# Patient Record
Sex: FEMALE | Race: WHITE | NOT HISPANIC OR LATINO | Employment: FULL TIME | URBAN - METROPOLITAN AREA
[De-identification: names, ages, dates, MRNs, and addresses within clinical notes are randomized per-mention and may not be internally consistent; named-entity substitution may affect disease eponyms.]

---

## 2018-11-16 DIAGNOSIS — Z31.69 WANTED BIRTH: Primary | ICD-10-CM

## 2018-11-16 DIAGNOSIS — N97.9 PRIMARY FEMALE INFERTILITY: ICD-10-CM

## 2018-11-17 ENCOUNTER — HOSPITAL ENCOUNTER (OUTPATIENT)
Dept: LAB | Facility: CLINIC | Age: 38
Discharge: HOME | End: 2018-11-17
Attending: OBSTETRICS & GYNECOLOGY | Admitting: OBSTETRICS & GYNECOLOGY
Payer: COMMERCIAL

## 2018-11-17 DIAGNOSIS — Z31.69 WANTED BIRTH: ICD-10-CM

## 2018-11-17 DIAGNOSIS — N97.9 PRIMARY FEMALE INFERTILITY: ICD-10-CM

## 2018-11-17 LAB
ESTRADIOL SERPL-MCNC: 23 PG/ML
FSH SERPL-ACNC: 4.4 IU/L
LH SERPL-ACNC: 5.2 IU/L
PROLACTIN SERPL-MCNC: 7 UG/L (ref 3–27)
TSH SERPL DL<=0.005 MIU/L-ACNC: 3.08 MU/L (ref 0.4–4)

## 2018-11-17 PROCEDURE — 82670 ASSAY OF TOTAL ESTRADIOL: CPT | Performed by: OBSTETRICS & GYNECOLOGY

## 2018-11-17 PROCEDURE — 84443 ASSAY THYROID STIM HORMONE: CPT | Performed by: OBSTETRICS & GYNECOLOGY

## 2018-11-17 PROCEDURE — 83002 ASSAY OF GONADOTROPIN (LH): CPT | Performed by: OBSTETRICS & GYNECOLOGY

## 2018-11-17 PROCEDURE — 83520 IMMUNOASSAY QUANT NOS NONAB: CPT | Performed by: OBSTETRICS & GYNECOLOGY

## 2018-11-17 PROCEDURE — 83001 ASSAY OF GONADOTROPIN (FSH): CPT | Performed by: OBSTETRICS & GYNECOLOGY

## 2018-11-17 PROCEDURE — 36415 COLL VENOUS BLD VENIPUNCTURE: CPT | Performed by: OBSTETRICS & GYNECOLOGY

## 2018-11-17 PROCEDURE — 84146 ASSAY OF PROLACTIN: CPT | Performed by: OBSTETRICS & GYNECOLOGY

## 2018-11-21 LAB — MIS SERPL-MCNC: 6.94 NG/ML (ref 0.18–11.71)

## 2019-02-10 ENCOUNTER — HOSPITAL ENCOUNTER (OUTPATIENT)
Dept: ULTRASOUND IMAGING | Facility: CLINIC | Age: 39
Discharge: HOME | End: 2019-02-10
Attending: OBSTETRICS & GYNECOLOGY | Admitting: OBSTETRICS & GYNECOLOGY
Payer: COMMERCIAL

## 2019-02-10 DIAGNOSIS — N97.9 PRIMARY FEMALE INFERTILITY: ICD-10-CM

## 2019-02-10 PROCEDURE — 76830 TRANSVAGINAL US NON-OB: CPT

## 2019-04-13 ENCOUNTER — HOSPITAL ENCOUNTER (OUTPATIENT)
Dept: ULTRASOUND IMAGING | Facility: CLINIC | Age: 39
Discharge: HOME | End: 2019-04-13
Attending: OBSTETRICS & GYNECOLOGY | Admitting: OBSTETRICS & GYNECOLOGY
Payer: COMMERCIAL

## 2019-04-13 DIAGNOSIS — N97.9 INFERTILITY, FEMALE: ICD-10-CM

## 2019-04-13 PROCEDURE — 76830 TRANSVAGINAL US NON-OB: CPT

## 2019-09-27 LAB
HBV SURFACE AG SERPL QL IA: NEGATIVE
HIV 1+2 AB+HIV1 P24 AG SERPL QL IA: NEGATIVE
RUBELLA ANTIBODY IGG QUANTITATIVE: NORMAL IU/ML

## 2019-10-25 ENCOUNTER — TRANSFERRED RECORDS (OUTPATIENT)
Dept: HEALTH INFORMATION MANAGEMENT | Facility: CLINIC | Age: 39
End: 2019-10-25

## 2019-10-25 ENCOUNTER — MEDICAL CORRESPONDENCE (OUTPATIENT)
Dept: HEALTH INFORMATION MANAGEMENT | Facility: CLINIC | Age: 39
End: 2019-10-25

## 2019-11-18 ENCOUNTER — PRE VISIT (OUTPATIENT)
Dept: MATERNAL FETAL MEDICINE | Facility: CLINIC | Age: 39
End: 2019-11-18

## 2019-11-21 ENCOUNTER — HOSPITAL ENCOUNTER (OUTPATIENT)
Dept: ULTRASOUND IMAGING | Facility: CLINIC | Age: 39
Discharge: HOME | End: 2019-11-21
Attending: OBSTETRICS & GYNECOLOGY | Admitting: OBSTETRICS & GYNECOLOGY
Payer: COMMERCIAL

## 2019-11-21 ENCOUNTER — OFFICE VISIT (OUTPATIENT)
Dept: MATERNAL FETAL MEDICINE | Facility: CLINIC | Age: 39
End: 2019-11-21
Attending: OBSTETRICS & GYNECOLOGY
Payer: COMMERCIAL

## 2019-11-21 DIAGNOSIS — O26.90 PREGNANCY RELATED CONDITION, ANTEPARTUM: ICD-10-CM

## 2019-11-21 DIAGNOSIS — O09.522 MULTIGRAVIDA OF ADVANCED MATERNAL AGE IN SECOND TRIMESTER: Primary | ICD-10-CM

## 2019-11-21 PROCEDURE — 76811 OB US DETAILED SNGL FETUS: CPT

## 2019-11-21 NOTE — PROGRESS NOTES
Please see full imaging report from [facility] program under imaging tab.    Marginal cord insertion - follow up with primary OB team.     Claudia Sandhu MD  Maternal Fetal Medicine

## 2019-12-08 ENCOUNTER — HEALTH MAINTENANCE LETTER (OUTPATIENT)
Age: 39
End: 2019-12-08

## 2020-02-19 RX ORDER — CEFAZOLIN SODIUM 2 G/100ML
2 INJECTION, SOLUTION INTRAVENOUS
Status: CANCELLED | OUTPATIENT
Start: 2020-02-19

## 2020-02-19 RX ORDER — CEFAZOLIN SODIUM 1 G/3ML
1 INJECTION, POWDER, FOR SOLUTION INTRAMUSCULAR; INTRAVENOUS SEE ADMIN INSTRUCTIONS
Status: CANCELLED | OUTPATIENT
Start: 2020-02-19

## 2020-02-19 RX ORDER — SODIUM CHLORIDE, SODIUM LACTATE, POTASSIUM CHLORIDE, CALCIUM CHLORIDE 600; 310; 30; 20 MG/100ML; MG/100ML; MG/100ML; MG/100ML
INJECTION, SOLUTION INTRAVENOUS CONTINUOUS
Status: CANCELLED | OUTPATIENT
Start: 2020-02-19

## 2020-02-19 RX ORDER — CITRIC ACID/SODIUM CITRATE 334-500MG
30 SOLUTION, ORAL ORAL
Status: CANCELLED | OUTPATIENT
Start: 2020-02-19

## 2020-03-30 LAB — GROUP B STREP PCR: NEGATIVE

## 2020-04-13 DIAGNOSIS — Z01.818 PREOPERATIVE EXAMINATION: Primary | ICD-10-CM

## 2020-04-15 ENCOUNTER — ANESTHESIA EVENT (OUTPATIENT)
Dept: OBGYN | Facility: CLINIC | Age: 40
End: 2020-04-15
Payer: COMMERCIAL

## 2020-04-15 ENCOUNTER — HOSPITAL ENCOUNTER (OUTPATIENT)
Dept: LAB | Facility: CLINIC | Age: 40
Discharge: HOME | End: 2020-04-15
Attending: PHYSICIAN ASSISTANT | Admitting: PHYSICIAN ASSISTANT
Payer: COMMERCIAL

## 2020-04-15 DIAGNOSIS — Z01.818 PREOPERATIVE EXAMINATION: ICD-10-CM

## 2020-04-15 LAB
ABO + RH BLD: NORMAL
ABO + RH BLD: NORMAL
BLD GP AB SCN SERPL QL: NORMAL
BLOOD BANK CMNT PATIENT-IMP: NORMAL
ERYTHROCYTE [DISTWIDTH] IN BLOOD BY AUTOMATED COUNT: 14.6 % (ref 10–15)
HCT VFR BLD AUTO: 34.1 % (ref 35–47)
HGB BLD-MCNC: 11.4 G/DL (ref 11.7–15.7)
MCH RBC QN AUTO: 30.9 PG (ref 26.5–33)
MCHC RBC AUTO-ENTMCNC: 33.4 G/DL (ref 31.5–36.5)
MCV RBC AUTO: 92 FL (ref 78–100)
PLATELET # BLD AUTO: 252 10E9/L (ref 150–450)
RBC # BLD AUTO: 3.69 10E12/L (ref 3.8–5.2)
SPECIMEN EXP DATE BLD: NORMAL
WBC # BLD AUTO: 10.8 10E9/L (ref 4–11)

## 2020-04-15 PROCEDURE — 86900 BLOOD TYPING SEROLOGIC ABO: CPT | Performed by: PHYSICIAN ASSISTANT

## 2020-04-15 PROCEDURE — 86850 RBC ANTIBODY SCREEN: CPT | Performed by: PHYSICIAN ASSISTANT

## 2020-04-15 PROCEDURE — 36415 COLL VENOUS BLD VENIPUNCTURE: CPT | Performed by: PHYSICIAN ASSISTANT

## 2020-04-15 PROCEDURE — 85027 COMPLETE CBC AUTOMATED: CPT | Performed by: PHYSICIAN ASSISTANT

## 2020-04-15 PROCEDURE — 86901 BLOOD TYPING SEROLOGIC RH(D): CPT | Performed by: PHYSICIAN ASSISTANT

## 2020-04-15 PROCEDURE — 86780 TREPONEMA PALLIDUM: CPT | Performed by: PHYSICIAN ASSISTANT

## 2020-04-16 ENCOUNTER — ANESTHESIA (OUTPATIENT)
Dept: OBGYN | Facility: CLINIC | Age: 40
End: 2020-04-16
Payer: COMMERCIAL

## 2020-04-16 ENCOUNTER — HOSPITAL ENCOUNTER (INPATIENT)
Facility: CLINIC | Age: 40
LOS: 2 days | Discharge: HOME | End: 2020-04-18
Attending: OBSTETRICS & GYNECOLOGY | Admitting: OBSTETRICS & GYNECOLOGY
Payer: COMMERCIAL

## 2020-04-16 DIAGNOSIS — Z98.891 S/P CESAREAN SECTION: Primary | ICD-10-CM

## 2020-04-16 LAB
HGB BLD-MCNC: 11.3 G/DL (ref 11.7–15.7)
T PALLIDUM AB SER QL: NONREACTIVE

## 2020-04-16 PROCEDURE — 12000035 ZZH R&B POSTPARTUM

## 2020-04-16 PROCEDURE — 25000128 H RX IP 250 OP 636: Performed by: OBSTETRICS & GYNECOLOGY

## 2020-04-16 PROCEDURE — 36415 COLL VENOUS BLD VENIPUNCTURE: CPT | Performed by: OBSTETRICS & GYNECOLOGY

## 2020-04-16 PROCEDURE — 25000125 ZZHC RX 250: Performed by: OBSTETRICS & GYNECOLOGY

## 2020-04-16 PROCEDURE — 37000008 ZZH ANESTHESIA TECHNICAL FEE, 1ST 30 MIN: Performed by: OBSTETRICS & GYNECOLOGY

## 2020-04-16 PROCEDURE — 37000009 ZZH ANESTHESIA TECHNICAL FEE, EACH ADDTL 15 MIN: Performed by: OBSTETRICS & GYNECOLOGY

## 2020-04-16 PROCEDURE — 25800030 ZZH RX IP 258 OP 636: Performed by: OBSTETRICS & GYNECOLOGY

## 2020-04-16 PROCEDURE — 71000012 ZZH RECOVERY PHASE 1 LEVEL 1 FIRST HR: Performed by: OBSTETRICS & GYNECOLOGY

## 2020-04-16 PROCEDURE — 25000132 ZZH RX MED GY IP 250 OP 250 PS 637

## 2020-04-16 PROCEDURE — 27210794 ZZH OR GENERAL SUPPLY STERILE: Performed by: OBSTETRICS & GYNECOLOGY

## 2020-04-16 PROCEDURE — 25000128 H RX IP 250 OP 636: Performed by: ANESTHESIOLOGY

## 2020-04-16 PROCEDURE — 25000125 ZZHC RX 250: Performed by: NURSE ANESTHETIST, CERTIFIED REGISTERED

## 2020-04-16 PROCEDURE — 25000128 H RX IP 250 OP 636: Performed by: NURSE ANESTHETIST, CERTIFIED REGISTERED

## 2020-04-16 PROCEDURE — 36000056 ZZH SURGERY LEVEL 3 1ST 30 MIN: Performed by: OBSTETRICS & GYNECOLOGY

## 2020-04-16 PROCEDURE — 25800030 ZZH RX IP 258 OP 636: Performed by: NURSE ANESTHETIST, CERTIFIED REGISTERED

## 2020-04-16 PROCEDURE — 25000132 ZZH RX MED GY IP 250 OP 250 PS 637: Performed by: OBSTETRICS & GYNECOLOGY

## 2020-04-16 PROCEDURE — 25000128 H RX IP 250 OP 636

## 2020-04-16 PROCEDURE — 36000058 ZZH SURGERY LEVEL 3 EA 15 ADDTL MIN: Performed by: OBSTETRICS & GYNECOLOGY

## 2020-04-16 PROCEDURE — 71000013 ZZH RECOVERY PHASE 1 LEVEL 1 EA ADDTL HR: Performed by: OBSTETRICS & GYNECOLOGY

## 2020-04-16 PROCEDURE — 85018 HEMOGLOBIN: CPT | Performed by: OBSTETRICS & GYNECOLOGY

## 2020-04-16 RX ORDER — BUPIVACAINE HYDROCHLORIDE 7.5 MG/ML
INJECTION, SOLUTION INTRASPINAL PRN
Status: DISCONTINUED | OUTPATIENT
Start: 2020-04-16 | End: 2020-04-16

## 2020-04-16 RX ORDER — ONDANSETRON 2 MG/ML
INJECTION INTRAMUSCULAR; INTRAVENOUS PRN
Status: DISCONTINUED | OUTPATIENT
Start: 2020-04-16 | End: 2020-04-16

## 2020-04-16 RX ORDER — OXYTOCIN/0.9 % SODIUM CHLORIDE 30/500 ML
100 PLASTIC BAG, INJECTION (ML) INTRAVENOUS CONTINUOUS
Status: DISCONTINUED | OUTPATIENT
Start: 2020-04-16 | End: 2020-04-18 | Stop reason: HOSPADM

## 2020-04-16 RX ORDER — CEFAZOLIN SODIUM 2 G/100ML
2 INJECTION, SOLUTION INTRAVENOUS
Status: COMPLETED | OUTPATIENT
Start: 2020-04-16 | End: 2020-04-16

## 2020-04-16 RX ORDER — SIMETHICONE 80 MG
80 TABLET,CHEWABLE ORAL 4 TIMES DAILY PRN
Status: DISCONTINUED | OUTPATIENT
Start: 2020-04-16 | End: 2020-04-18 | Stop reason: HOSPADM

## 2020-04-16 RX ORDER — MISOPROSTOL 200 UG/1
800 TABLET ORAL
Status: DISCONTINUED | OUTPATIENT
Start: 2020-04-16 | End: 2020-04-18 | Stop reason: HOSPADM

## 2020-04-16 RX ORDER — LEVOTHYROXINE SODIUM 25 UG/1
TABLET ORAL
Status: ON HOLD | COMMUNITY
Start: 2018-12-01 | End: 2020-04-18

## 2020-04-16 RX ORDER — CITRIC ACID/SODIUM CITRATE 334-500MG
SOLUTION, ORAL ORAL
Status: COMPLETED
Start: 2020-04-16 | End: 2020-04-16

## 2020-04-16 RX ORDER — HYDROCORTISONE 2.5 %
CREAM (GRAM) TOPICAL 3 TIMES DAILY PRN
Status: DISCONTINUED | OUTPATIENT
Start: 2020-04-16 | End: 2020-04-18 | Stop reason: HOSPADM

## 2020-04-16 RX ORDER — CEFAZOLIN SODIUM 1 G/3ML
1 INJECTION, POWDER, FOR SOLUTION INTRAMUSCULAR; INTRAVENOUS SEE ADMIN INSTRUCTIONS
Status: DISCONTINUED | OUTPATIENT
Start: 2020-04-16 | End: 2020-04-16 | Stop reason: HOSPADM

## 2020-04-16 RX ORDER — OXYTOCIN 10 [USP'U]/ML
10 INJECTION, SOLUTION INTRAMUSCULAR; INTRAVENOUS
Status: DISCONTINUED | OUTPATIENT
Start: 2020-04-16 | End: 2020-04-18 | Stop reason: HOSPADM

## 2020-04-16 RX ORDER — DEXTROSE, SODIUM CHLORIDE, SODIUM LACTATE, POTASSIUM CHLORIDE, AND CALCIUM CHLORIDE 5; .6; .31; .03; .02 G/100ML; G/100ML; G/100ML; G/100ML; G/100ML
INJECTION, SOLUTION INTRAVENOUS CONTINUOUS
Status: DISCONTINUED | OUTPATIENT
Start: 2020-04-16 | End: 2020-04-18 | Stop reason: HOSPADM

## 2020-04-16 RX ORDER — CEFAZOLIN SODIUM 2 G/100ML
INJECTION, SOLUTION INTRAVENOUS
Status: COMPLETED
Start: 2020-04-16 | End: 2020-04-16

## 2020-04-16 RX ORDER — MODIFIED LANOLIN
OINTMENT (GRAM) TOPICAL
Status: DISCONTINUED | OUTPATIENT
Start: 2020-04-16 | End: 2020-04-18 | Stop reason: HOSPADM

## 2020-04-16 RX ORDER — ONDANSETRON 2 MG/ML
4 INJECTION INTRAMUSCULAR; INTRAVENOUS EVERY 6 HOURS PRN
Status: DISCONTINUED | OUTPATIENT
Start: 2020-04-16 | End: 2020-04-18 | Stop reason: HOSPADM

## 2020-04-16 RX ORDER — SODIUM CHLORIDE, SODIUM LACTATE, POTASSIUM CHLORIDE, CALCIUM CHLORIDE 600; 310; 30; 20 MG/100ML; MG/100ML; MG/100ML; MG/100ML
INJECTION, SOLUTION INTRAVENOUS CONTINUOUS
Status: DISCONTINUED | OUTPATIENT
Start: 2020-04-16 | End: 2020-04-16 | Stop reason: HOSPADM

## 2020-04-16 RX ORDER — NALBUPHINE HYDROCHLORIDE 10 MG/ML
2.5-5 INJECTION, SOLUTION INTRAMUSCULAR; INTRAVENOUS; SUBCUTANEOUS EVERY 6 HOURS PRN
Status: DISCONTINUED | OUTPATIENT
Start: 2020-04-16 | End: 2020-04-18 | Stop reason: HOSPADM

## 2020-04-16 RX ORDER — TRANEXAMIC ACID 10 MG/ML
1 INJECTION, SOLUTION INTRAVENOUS EVERY 30 MIN PRN
Status: DISCONTINUED | OUTPATIENT
Start: 2020-04-16 | End: 2020-04-18 | Stop reason: HOSPADM

## 2020-04-16 RX ORDER — OXYTOCIN/0.9 % SODIUM CHLORIDE 30/500 ML
340 PLASTIC BAG, INJECTION (ML) INTRAVENOUS CONTINUOUS PRN
Status: DISCONTINUED | OUTPATIENT
Start: 2020-04-16 | End: 2020-04-18 | Stop reason: HOSPADM

## 2020-04-16 RX ORDER — EPHEDRINE SULFATE 50 MG/ML
5 INJECTION, SOLUTION INTRAMUSCULAR; INTRAVENOUS; SUBCUTANEOUS
Status: DISCONTINUED | OUTPATIENT
Start: 2020-04-16 | End: 2020-04-18 | Stop reason: HOSPADM

## 2020-04-16 RX ORDER — ONDANSETRON 2 MG/ML
4 INJECTION INTRAMUSCULAR; INTRAVENOUS EVERY 6 HOURS PRN
Status: DISCONTINUED | OUTPATIENT
Start: 2020-04-16 | End: 2020-04-16

## 2020-04-16 RX ORDER — LIDOCAINE 40 MG/G
CREAM TOPICAL
Status: DISCONTINUED | OUTPATIENT
Start: 2020-04-16 | End: 2020-04-18 | Stop reason: HOSPADM

## 2020-04-16 RX ORDER — ACETAMINOPHEN 325 MG/1
975 TABLET ORAL EVERY 6 HOURS
Status: DISCONTINUED | OUTPATIENT
Start: 2020-04-16 | End: 2020-04-18 | Stop reason: HOSPADM

## 2020-04-16 RX ORDER — CITRIC ACID/SODIUM CITRATE 334-500MG
30 SOLUTION, ORAL ORAL
Status: COMPLETED | OUTPATIENT
Start: 2020-04-16 | End: 2020-04-16

## 2020-04-16 RX ORDER — IBUPROFEN 400 MG/1
800 TABLET, FILM COATED ORAL EVERY 6 HOURS
Status: DISCONTINUED | OUTPATIENT
Start: 2020-04-17 | End: 2020-04-18 | Stop reason: HOSPADM

## 2020-04-16 RX ORDER — OXYCODONE HYDROCHLORIDE 5 MG/1
5 TABLET ORAL EVERY 4 HOURS PRN
Status: DISCONTINUED | OUTPATIENT
Start: 2020-04-16 | End: 2020-04-18 | Stop reason: HOSPADM

## 2020-04-16 RX ORDER — KETOROLAC TROMETHAMINE 30 MG/ML
30 INJECTION, SOLUTION INTRAMUSCULAR; INTRAVENOUS EVERY 6 HOURS
Status: COMPLETED | OUTPATIENT
Start: 2020-04-16 | End: 2020-04-16

## 2020-04-16 RX ORDER — NALOXONE HYDROCHLORIDE 0.4 MG/ML
.1-.4 INJECTION, SOLUTION INTRAMUSCULAR; INTRAVENOUS; SUBCUTANEOUS
Status: DISCONTINUED | OUTPATIENT
Start: 2020-04-16 | End: 2020-04-18 | Stop reason: HOSPADM

## 2020-04-16 RX ORDER — BISACODYL 10 MG
10 SUPPOSITORY, RECTAL RECTAL DAILY PRN
Status: DISCONTINUED | OUTPATIENT
Start: 2020-04-18 | End: 2020-04-18 | Stop reason: HOSPADM

## 2020-04-16 RX ORDER — ONDANSETRON 4 MG/1
4 TABLET, ORALLY DISINTEGRATING ORAL EVERY 6 HOURS PRN
Status: DISCONTINUED | OUTPATIENT
Start: 2020-04-16 | End: 2020-04-18 | Stop reason: HOSPADM

## 2020-04-16 RX ORDER — ONDANSETRON 2 MG/ML
INJECTION INTRAMUSCULAR; INTRAVENOUS
Status: COMPLETED
Start: 2020-04-16 | End: 2020-04-16

## 2020-04-16 RX ORDER — ACETAMINOPHEN 325 MG/1
975 TABLET ORAL ONCE
Status: COMPLETED | OUTPATIENT
Start: 2020-04-16 | End: 2020-04-16

## 2020-04-16 RX ORDER — AMOXICILLIN 250 MG
2 CAPSULE ORAL 2 TIMES DAILY
Status: DISCONTINUED | OUTPATIENT
Start: 2020-04-16 | End: 2020-04-18 | Stop reason: HOSPADM

## 2020-04-16 RX ORDER — OXYTOCIN/0.9 % SODIUM CHLORIDE 30/500 ML
PLASTIC BAG, INJECTION (ML) INTRAVENOUS CONTINUOUS PRN
Status: DISCONTINUED | OUTPATIENT
Start: 2020-04-16 | End: 2020-04-16

## 2020-04-16 RX ORDER — DIPHENHYDRAMINE HYDROCHLORIDE 50 MG/ML
25 INJECTION INTRAMUSCULAR; INTRAVENOUS EVERY 6 HOURS PRN
Status: DISCONTINUED | OUTPATIENT
Start: 2020-04-16 | End: 2020-04-18 | Stop reason: HOSPADM

## 2020-04-16 RX ORDER — MORPHINE SULFATE 1 MG/ML
INJECTION, SOLUTION EPIDURAL; INTRATHECAL; INTRAVENOUS
Status: COMPLETED
Start: 2020-04-16 | End: 2020-04-16

## 2020-04-16 RX ORDER — OXYTOCIN/0.9 % SODIUM CHLORIDE 30/500 ML
PLASTIC BAG, INJECTION (ML) INTRAVENOUS
Status: DISCONTINUED
Start: 2020-04-16 | End: 2020-04-16 | Stop reason: HOSPADM

## 2020-04-16 RX ORDER — ACETAMINOPHEN 325 MG/1
TABLET ORAL
Status: COMPLETED
Start: 2020-04-16 | End: 2020-04-16

## 2020-04-16 RX ORDER — MORPHINE SULFATE 1 MG/ML
150 INJECTION, SOLUTION EPIDURAL; INTRATHECAL; INTRAVENOUS ONCE
Status: COMPLETED | OUTPATIENT
Start: 2020-04-16 | End: 2020-04-16

## 2020-04-16 RX ORDER — AMOXICILLIN 250 MG
1 CAPSULE ORAL 2 TIMES DAILY
Status: DISCONTINUED | OUTPATIENT
Start: 2020-04-16 | End: 2020-04-18 | Stop reason: HOSPADM

## 2020-04-16 RX ORDER — KETOROLAC TROMETHAMINE 30 MG/ML
INJECTION, SOLUTION INTRAMUSCULAR; INTRAVENOUS
Status: DISCONTINUED
Start: 2020-04-16 | End: 2020-04-16 | Stop reason: HOSPADM

## 2020-04-16 RX ADMIN — ACETAMINOPHEN 975 MG: 325 TABLET, FILM COATED ORAL at 11:05

## 2020-04-16 RX ADMIN — SODIUM CHLORIDE, POTASSIUM CHLORIDE, SODIUM LACTATE AND CALCIUM CHLORIDE: 600; 310; 30; 20 INJECTION, SOLUTION INTRAVENOUS at 05:25

## 2020-04-16 RX ADMIN — SODIUM CHLORIDE, SODIUM LACTATE, POTASSIUM CHLORIDE, CALCIUM CHLORIDE AND DEXTROSE MONOHYDRATE: 5; 600; 310; 30; 20 INJECTION, SOLUTION INTRAVENOUS at 16:25

## 2020-04-16 RX ADMIN — Medication 30 ML: at 07:05

## 2020-04-16 RX ADMIN — Medication 100 ML/HR: at 11:20

## 2020-04-16 RX ADMIN — PHENYLEPHRINE HYDROCHLORIDE 100 MCG: 10 INJECTION INTRAVENOUS at 07:32

## 2020-04-16 RX ADMIN — ACETAMINOPHEN 975 MG: 325 TABLET, FILM COATED ORAL at 19:03

## 2020-04-16 RX ADMIN — KETOROLAC TROMETHAMINE 30 MG: 30 INJECTION, SOLUTION INTRAMUSCULAR at 14:46

## 2020-04-16 RX ADMIN — ONDANSETRON 4 MG: 2 INJECTION INTRAMUSCULAR; INTRAVENOUS at 08:02

## 2020-04-16 RX ADMIN — CEFAZOLIN SODIUM 2 G: 2 INJECTION, SOLUTION INTRAVENOUS at 07:27

## 2020-04-16 RX ADMIN — KETOROLAC TROMETHAMINE 30 MG: 30 INJECTION, SOLUTION INTRAMUSCULAR at 09:12

## 2020-04-16 RX ADMIN — PHENYLEPHRINE HYDROCHLORIDE 0.4 MCG/KG/MIN: 10 INJECTION INTRAVENOUS at 07:24

## 2020-04-16 RX ADMIN — Medication 340 ML/HR: at 07:47

## 2020-04-16 RX ADMIN — MIDAZOLAM 0.5 MG: 1 INJECTION INTRAMUSCULAR; INTRAVENOUS at 08:02

## 2020-04-16 RX ADMIN — ACETAMINOPHEN 975 MG: 325 TABLET, FILM COATED ORAL at 06:10

## 2020-04-16 RX ADMIN — MORPHINE SULFATE 0.2 MG: 1 INJECTION, SOLUTION EPIDURAL; INTRATHECAL; INTRAVENOUS at 07:24

## 2020-04-16 RX ADMIN — BUPIVACAINE HYDROCHLORIDE IN DEXTROSE 12 MG: 7.5 INJECTION, SOLUTION SUBARACHNOID at 07:24

## 2020-04-16 RX ADMIN — KETOROLAC TROMETHAMINE 30 MG: 30 INJECTION, SOLUTION INTRAMUSCULAR at 21:12

## 2020-04-16 RX ADMIN — SENNOSIDES AND DOCUSATE SODIUM 1 TABLET: 8.6; 5 TABLET ORAL at 21:13

## 2020-04-16 RX ADMIN — SODIUM CITRATE AND CITRIC ACID MONOHYDRATE 30 ML: 500; 334 SOLUTION ORAL at 07:05

## 2020-04-16 RX ADMIN — ACETAMINOPHEN 975 MG: 325 TABLET ORAL at 06:10

## 2020-04-16 RX ADMIN — ONDANSETRON 4 MG: 2 INJECTION INTRAMUSCULAR; INTRAVENOUS at 16:25

## 2020-04-16 RX ADMIN — DEXMEDETOMIDINE HYDROCHLORIDE 12 MCG: 100 INJECTION, SOLUTION INTRAVENOUS at 08:02

## 2020-04-16 NOTE — H&P
2020      40yo  @ 39.0 wga here for a repeat  section.  Her prenatal care has been uncomplicated other than: 1. H/o prior  section (for malpresentation, failed ECV), 2. Infertility (this was a spontaneous conception).  There have been no other major medical changes based upon review of chart, nursing records, and examination.      PNLs: A pos, ABS neg, RI, RPR NR, HIV NR, HBsAg neg, GBS neg      PMH: none    PSH: C/S x 1 (), cholecystectomy    MEDs: PNV, levothyroxine (started for fertility purposes)    ALL: NKDA    SH: neg x 3    /73   Temp 98  F (36.7  C) (Oral)   Wt 88 kg (194 lb)   LMP 2019   BMI 29.50 kg/m    Gen: NAD, A&O x 3  CV: RRR, no MRG  Resp: CTAB  Abd: gravid, NT  SVE: deferred  Ext: mild edema BL LEs, symmetric, no CT    Hemoglobin   Date Value Ref Range Status   2020 11.3 (L) 11.7 - 15.7 g/dL Final         A/P: 40yo  @ 39.0 wga here for a repeat  section.  Exam benign.  AVSS.  - risks/benefits of  section were discuss with the patient including the risks of : vascular injury with possible blood transfusion, visceral injury including but no limited to damage to bowel/bladder/ureter, infection, prolonged hospitalization, and possible reoperation.  Pt understands and agrees, consent signed.      DEANNE SAGASTUME MD

## 2020-04-16 NOTE — ANESTHESIA PREPROCEDURE EVALUATION
"Anesthesia Pre-Procedure Evaluation    Patient: Eda Amaral   MRN: [id number] : 1980          Preoperative Diagnosis: Previous  delivery affecting pregnancy [O34.219]    Procedure(s):  REPEAT  SECTION    History reviewed. No pertinent past medical history.  Past Surgical History:   Procedure Laterality Date      SECTION N/A 9/15/2015    Procedure:  SECTION;  Surgeon: Mark Anthony Celestin MD;  Location:  L+D     LAPAROSCOPIC CHOLECYSTECTOMY N/A 2016    Procedure: LAPAROSCOPIC CHOLECYSTECTOMY;  Surgeon: Deborah Madera MD;  Location:  OR                        Lab Results   Component Value Date    WBC 10.8 04/15/2020    HGB 11.3 (L) 2020    HCT 34.1 (L) 04/15/2020     04/15/2020    CRP <2.9 2016    SED 9 2016     2016    POTASSIUM 4.4 2016    CHLORIDE 105 2016    CO2 25 2016    BUN 11 2016    CR 0.78 2016     (H) 2016    AMY 8.5 2016    PHOS 3.5 2016    MAG 1.7 2016    ALBUMIN 3.7 2016    PROTTOTAL 7.0 2016    ALT 49 2016    AST 21 2016    ALKPHOS 63 2016    BILITOTAL 0.4 2016    LIPASE 122 2016    TSH 3.08 2018    HCG Negative 2016    HCGS Negative 2016       Preop Vitals  BP Readings from Last 3 Encounters:   20 120/73   16 116/64   16 121/71    Pulse Readings from Last 3 Encounters:   16 84   16 64   09/18/15 76      Resp Readings from Last 3 Encounters:   16 14   16 16   16 18    SpO2 Readings from Last 3 Encounters:   16 100%   16 98%   16 98%      Temp Readings from Last 1 Encounters:   20 36.7  C (98  F) (Oral)    Ht Readings from Last 1 Encounters:   16 1.727 m (5' 8\")      Wt Readings from Last 1 Encounters:   20 88 kg (194 lb)    Estimated body mass index is 29.5 kg/m  as calculated from the following:    Height as of " "11/28/16: 1.727 m (5' 8\").    Weight as of this encounter: 88 kg (194 lb).       Anesthesia Plan      History & Physical Review  History and physical reviewed and following examination; no interval change.    ASA Status:  2 .        Plan for Spinal   PONV prophylaxis:  Ondansetron (or other 5HT-3)         Postoperative Care      Consents  Anesthetic plan, risks, benefits and alternatives discussed with:  Patient..                 Mark Huber,   "

## 2020-04-16 NOTE — PLAN OF CARE
VSS, pain adequately-controlled, IVF infusing, and merrill draining adequately.  The pt c/o intermittent nausea/vomiting (350cc emesis), she was given IV Zofran (reports relief), and sips of water were encouraged.  She attempted to breastfeed, but her son was sleepy/spitty, and disinterested.  Will continue to monitor

## 2020-04-16 NOTE — ANESTHESIA CARE TRANSFER NOTE
Patient: Eda Amaral    Procedure(s):  REPEAT  SECTION    Diagnosis: Previous  delivery affecting pregnancy [O34.219]  Diagnosis Additional Information: No value filed.    Anesthesia Type:   Spinal     Note:  Airway :Nasal Cannula  Patient transferred to:PACU  Comments: Pt exhibits spont resps, all monitors and alarms on in pacu, report given to RN, vss.Handoff Report: Identifed the Patient, Identified the Reponsible Provider, Reviewed the pertinent medical history, Discussed the surgical course, Reviewed Intra-OP anesthesia mangement and issues during anesthesia, Set expectations for post-procedure period and Allowed opportunity for questions and acknowledgement of understanding      Vitals: (Last set prior to Anesthesia Care Transfer)    CRNA VITALS  2020 0752 - 2020 0822      2020             Resp Rate (set):  10                Electronically Signed By: GERHARD Galindo CRNA  2020  8:22 AM

## 2020-04-16 NOTE — PLAN OF CARE
Transferred from PACU by bed with baby in arms. Feels well. Vitals stable. IV infusing. Castro draining. Breast feeding. Clear liquids started.

## 2020-04-16 NOTE — LACTATION NOTE
Initial visit with HERNANDEZ Veras and baby.   her now 4.5 year old for over a year.  This infant was able to latch on post delivery and nurse for an hour.     Breastfeeding general information reviewed.   Advised to breastfeed exclusively, on demand, avoid pacifiers, bottles and formula unless medically indicated.  Encouraged rooming in, skin to skin, feeding on demand 8-12x/day or sooner if baby cues.  Explained benefits of holding and skin to skin.  Encouraged lots of skin to skin. Instructed on hand expression.  Plans is to follow up with Marilyn.  Has a breast pump.    Continues to nurse well per mom. No further questions at this time.   Will follow as needed.   Milady Zhong BSN, RN, PHN, RNC-MNN, IBCLC

## 2020-04-16 NOTE — PLAN OF CARE
Data: Eda Amaral transferred to 433 via patient bed at 1040 Baby transferred via secure in mother's arms.  Action: Receiving unit notified of transfer: Yes. Patient and family notified of room change. Report given to Katelyn RN and ADRIA RN at 1045. Belongings sent to receiving unit. Accompanied by Registered Nurse. Oriented patient to surroundings. Call light within reach. ID bands double-checked with receiving RN.  Response: Patient tolerated transfer and is stable.

## 2020-04-16 NOTE — PLAN OF CARE
Dr. Rapp at beside and requested that merrill remaiin in place until patient can move lower extremities easily.

## 2020-04-16 NOTE — ANESTHESIA POSTPROCEDURE EVALUATION
Patient: Eda Amaral    Procedure(s):  REPEAT  SECTION    Diagnosis:Previous  delivery affecting pregnancy [O34.219]  Diagnosis Additional Information: No value filed.    Anesthesia Type:  Spinal    Note:  Anesthesia Post Evaluation    Patient location during evaluation: PACU  Patient participation: Able to participate in evaluation but full recovery from regional anesthesia has not yet ocurrred but is anticipated to occur within 48 hours  Level of consciousness: awake and alert  Pain management: adequate  Airway patency: patent  Cardiovascular status: acceptable and hemodynamically stable  Respiratory status: acceptable and unassisted  Hydration status: acceptable  PONV: none             Last vitals:  Vitals:    20 1045 20 1115 20 1208   BP: 105/56 105/64 125/64   Pulse: 66 65    Resp: 16  16   Temp: 36.6  C (97.8  F)     SpO2: 96% 98%          Electronically Signed By: Mark Huber DO  2020  12:19 PM

## 2020-04-16 NOTE — PLAN OF CARE
Vitals stable. Sat at edge of bed- vomited- returned to bed- felt much better. Pericare carried out. Castro emptied. Feels tired- will rest this afternoon.IV infusing. Breast feeding going well.

## 2020-04-16 NOTE — OP NOTE
Procedure Date: 2020      PREOPERATIVE DIAGNOSES:     1.  Intrauterine pregnancy at 39 weeks' gestation.   2.  History of prior  section.      POSTOPERATIVE DIAGNOSES:   1.  Intrauterine pregnancy at 39 weeks' gestation.   2.  History of prior  section.      PROCEDURE:  Repeat low transverse  section.      SURGEON:  Los Rapp MD      ASSISTANT:  JERARDO Ritchie      ANESTHESIA:  Spinal.      IV FLUIDS:  Please see anesthesia MAR.      ESTIMATED BLOOD LOSS:  Approximately 360 mL.      URINE OUTPUT:  50 mL, clear.      SPECIMEN DISPOSITION:  None.      FINDINGS:  A viable male infant in vertex presentation, clear amniotic fluid, Apgars were 8 and 9, weighing 8 pounds 13 ounces.  Normal-appearing uterus, bilateral fallopian tubes, and bilateral ovaries.      INDICATION AND CONSENT:  The patient is a 39-year-old G2, P1-0-0-1, who presented to Labor and Delivery at 39 weeks' gestation for a scheduled repeat  section.  Of note, her prenatal care had been uncomplicated, other than a history of prior  section for malpresentation.  In light of an unfavorable cervix, and desire to avoid emergency C/S and general anesthesia during COVID-19 pandemic in the event of uterine rupture, the decision was made to proceed with repeat  section rather than TOLAC.  The risks, benefits, alternatives of the procedure were discussed with the patient, including the risks of vascular injury, possible need for blood transfusion, the risk of visceral injury including but not limited to damage to bowel, bladder, uterus, risk for infection, prolonged hospitalization and possible reoperation.  The patient understood these risks and desired to proceed.  All questions were answered and consent was signed.      DESCRIPTION OF PROCEDURE:  The patient was taken to the operating room where spinal anesthesia was administered without difficulty and was found to be adequate.  She was prepped  and draped in a dorsal supine position with a leftward tilt.  Using a scalpel, a Pfannenstiel skin incision was made over her prior  section scar and carried down to the level of the fascia with the scalpel.  Of note, there was noted to be moderate dense scar tissue of the subcutaneous layer.  Using Azevedo scissors, the fascia was extended laterally and curvilinearly.  Using 2 Kocher clamps, the superior aspect of the fascia was grasped, and the underlying rectus muscle was dissected off sharply with Azevedo scissors, as well as with the scalpel.  Once again, some dense scar tissue was noted.  In similar fashion, the inferior aspect of the fascia was grasped with Kocher clamps and the underlying rectus and pyramidalis muscles were dissected off sharply with Azevedo scissors.  The rectus muscle was  in the midline.  The peritoneum was entered sharply with Metzenbaum scissors and extended with blunt traction.  Intraabdominal survey revealed clear, scant intraperitoneal fluid.  Using a bladder blade, this was placed to keep the bladder out of the operative field.  A bladder flap was created from the vesicouterine peritoneum using Metzenbaum scissors and the bladder blade was repositioned to keep the bladder out of the operative field.  Scalpel was used to make a transverse incision in the lower uterine segment and extended laterally and curvilinearly using bandage scissors and blunt traction.  Amniotomy was performed and clear fluid was noted.  Infant was found to be in vertex presentation.  The infant's head was elevated to the level of the uterine incision with special attention paid to avoid using the incision as a fulcrum.  However, despite fundal pressure, the infant's head did not deliver easily.  A Kiwi vacuum was placed on the sagittal suture line in between the fontanelles and with fundal pressure and 1 gentle pull, fetal head was able to then be easily delivered.  The vacuum was immediately released.   There was a loose nuchal cord x 1 which was reduced and then the remainder of the infant delivered without further difficulty.  The infant was bulb suctioned and the cord was clamped and cut after 1 minute.  The infant was handed off to the nurse for further assessment.  Cord blood was then obtained.  Placenta then delivered spontaneously intact with a 3-vessel cord.  Uterus was then exteriorized, and the inside of the uterus was wiped vigorously with moist laparotomy sponges to assure complete removal of placental membranes.  The bladder blade was repositioned to keep the bladder out of the operative field.  The uterine incision was closed with 0 Vicryl suture in a running, locked fashion.  A second imbricating layer was placed with 0 Vicryl suture.  Reinspection revealed slight oozing along the right aspect of the incision and an additional figure-of-eight suture was placed.  Reinspection revealed excellent hemostasis.  At this time, bilateral fallopian tubes and ovaries appeared within normal.  The uterus was replaced into the intra-abdominal cavity and reinspection revealed excellent hemostasis.  The pericolic gutters were cleaned out with moist laparotomy sponges and reinspection of the uterine incision revealed excellent hemostasis.  Inspection of the rectus muscle layer revealed hemostasis.  The fascia was then closed with 0 Vicryl suture in a continuous running fashion.  The subcutaneous layer was irrigated and oozing was cauterized with the electrocautery device.  Reinspection revealed excellent hemostasis.  Skin was then closed with 4-0 Monocryl suture in a subcuticular fashion.  All sponge and instrument counts were correct x 2.  The patient tolerated the procedure well and was returned to recovery room in stable condition. Physician assistant was needed to assist with retraction, suturing, and instrument manipulation.        DEANNE SAGASTUME MD             D: 04/16/2020   T: 04/16/2020   MT: DEXTER       Name:     ELIER CHAVIRA   MRN:      [id number]        Account:        [id number]   :      1980           Procedure Date: 2020      Document: [id number]

## 2020-04-16 NOTE — ANESTHESIA PROCEDURE NOTES
Procedure note : intrathecal  Staff -   Anesthesiologist:  Mark Huber DO      Performed By: anesthesiologist        Pre-Procedure  Performed by Mark Huber DO  Location: OR      Pre-Anesthestic Checklist: patient identified, IV checked, site marked, risks and benefits discussed, informed consent, monitors and equipment checked, pre-op evaluation and at physician/surgeon's request    Timeout  Correct Patient: Yes   Correct Procedure: Yes   Correct Site: Yes   Correct Laterality: Yes   Correct Position: Yes   Site Marked: Yes   .   Procedure Documentation  ASA 2  .    Procedure: intrathecal, .   Patient Position:sitting Insertion Site:L2-3  (midline approach)     Patient Prep/Sterile Barriers; povidone-iodine 7.5% surgical scrub.  .  Needle:  Spinal Needle (gauge): 24  Spinal/LP Needle Length (inches): 3.5 # of attempts: 2 and # of redirects:  Introducer used Introducer: 20 G .        Assessment/Narrative  Paresthesias: No.  .  .  clear CSF fluid removed . Time Injected:while sitting   Comments:  Patient sitting on edge of OR bed, lower back cleaned and prepped in sterile fashion with betadine. 1% lido used to numb area. 24g 3.5 Jill Dick; Introducer placed, spinal needle through introducer. Appropriate flow of CSF and confirmed with aspiration via syringe. Spinal dose given, 12 mg 0.75% bupivacaine. No complications.

## 2020-04-16 NOTE — PLAN OF CARE
Fine red rash noted on patient from head to toes. VSS, respirations clear and equal bilaterally. Patient denies discomfort. Patient reports this (rash over extremities) is not unusual for her. Dr. SYDNEY Huber notified and came to bedside. Order received for benadryl if needed for itching.

## 2020-04-16 NOTE — BRIEF OP NOTE
[facility]    Brief Operative Note    Pre-operative diagnosis: 1. Intrauterine pregnancy at 39.0 wga, 2. History of previous  section  Post-operative diagnosis 1. Intrauterine pregnancy at 39.0 wga, 2. History of previous  section  Procedure: Procedure(s):  REPEAT  SECTION, low transverse  Surgeon: Surgeon(s) and Role:     * Los Rapp MD - Primary  Anesthesia: Spinal   Estimated blood loss: 360 cc  Drains: None  Specimens:   ID Type Source Tests Collected by Time Destination   1 :  Tissue Umbilical Cord OR DOCUMENTATION ONLY Los Rapp MD 2020  7:46 AM    2 :  Cord blood Umbilical Cord OR DOCUMENTATION ONLY Los Rapp MD 2020  7:46 AM    3 :  Placenta Placenta OR DOCUMENTATION ONLY Los Rapp MD 2020  8:03 AM      Findings:   viable male infant in vertex presentation, clear amniotic fluid, Apgars 8/9, 8 lb 13 oz. Normal appearing BL fallopian tubes and ovaries  Complications: None.  Implants: * No implants in log *

## 2020-04-17 LAB — HGB BLD-MCNC: 10.2 G/DL (ref 11.7–15.7)

## 2020-04-17 PROCEDURE — 25000132 ZZH RX MED GY IP 250 OP 250 PS 637: Performed by: OBSTETRICS & GYNECOLOGY

## 2020-04-17 PROCEDURE — 12000035 ZZH R&B POSTPARTUM

## 2020-04-17 PROCEDURE — 36415 COLL VENOUS BLD VENIPUNCTURE: CPT | Performed by: OBSTETRICS & GYNECOLOGY

## 2020-04-17 PROCEDURE — 85018 HEMOGLOBIN: CPT | Performed by: OBSTETRICS & GYNECOLOGY

## 2020-04-17 RX ORDER — FERROUS SULFATE 325(65) MG
325 TABLET ORAL DAILY
Status: DISCONTINUED | OUTPATIENT
Start: 2020-04-17 | End: 2020-04-18 | Stop reason: HOSPADM

## 2020-04-17 RX ADMIN — ACETAMINOPHEN 975 MG: 325 TABLET, FILM COATED ORAL at 19:59

## 2020-04-17 RX ADMIN — ACETAMINOPHEN 975 MG: 325 TABLET, FILM COATED ORAL at 05:13

## 2020-04-17 RX ADMIN — SENNOSIDES AND DOCUSATE SODIUM 1 TABLET: 8.6; 5 TABLET ORAL at 09:32

## 2020-04-17 RX ADMIN — IBUPROFEN 800 MG: 400 TABLET, FILM COATED ORAL at 09:32

## 2020-04-17 RX ADMIN — SENNOSIDES AND DOCUSATE SODIUM 1 TABLET: 8.6; 5 TABLET ORAL at 19:59

## 2020-04-17 RX ADMIN — IBUPROFEN 800 MG: 400 TABLET, FILM COATED ORAL at 22:26

## 2020-04-17 RX ADMIN — IBUPROFEN 800 MG: 400 TABLET, FILM COATED ORAL at 16:33

## 2020-04-17 RX ADMIN — FERROUS SULFATE TAB 325 MG (65 MG ELEMENTAL FE) 325 MG: 325 (65 FE) TAB at 16:34

## 2020-04-17 RX ADMIN — ACETAMINOPHEN 975 MG: 325 TABLET, FILM COATED ORAL at 14:04

## 2020-04-17 NOTE — PLAN OF CARE
Patient up independently and tolerating activity.  Fundus is firm.  Voiding without difficulty since merrill dc'd.  Hgb is 10.2.  Saline lock dc'd.  Dressing removed and incision intact with steri strips.  Independent with breastfeeding.  Doing well with infant cares.

## 2020-04-17 NOTE — PLAN OF CARE
Fundus firm and bleeding wnl.  VSS.  Incision clean, dry, intact and covered with steri strips.  Rates pain 2/10 and taking scheduled tylenol, and Toradol as needed with good relief.  Became slightly nauseous and dizzy when walking in the room.  Using abdominal binder.  Passing flatus and tolerating regular diet.  Encouraged to call with questions or concerns.

## 2020-04-17 NOTE — PROGRESS NOTES
Doing well.    vss afeb  Abdomen soft and nt  Fundus firm and nt  Incision dry and intact steri strips in place  Extremities nl no edema    Hgb= 10.2    A: POD 1 s/p scheduled repeat c/s      Doing well       Mild anemia    P: Begin oral Fe      Routine post care      Discharge tomorrow

## 2020-04-18 VITALS
OXYGEN SATURATION: 99 % | BODY MASS INDEX: 29.5 KG/M2 | DIASTOLIC BLOOD PRESSURE: 62 MMHG | SYSTOLIC BLOOD PRESSURE: 101 MMHG | WEIGHT: 194 LBS | RESPIRATION RATE: 16 BRPM | TEMPERATURE: 98.5 F | HEART RATE: 79 BPM

## 2020-04-18 PROCEDURE — 25000132 ZZH RX MED GY IP 250 OP 250 PS 637: Performed by: OBSTETRICS & GYNECOLOGY

## 2020-04-18 RX ORDER — OXYCODONE HYDROCHLORIDE 5 MG/1
5 TABLET ORAL EVERY 4 HOURS PRN
Qty: 20 TABLET | Refills: 0 | Status: SHIPPED | OUTPATIENT
Start: 2020-04-18 | End: 2022-07-11

## 2020-04-18 RX ADMIN — ACETAMINOPHEN 975 MG: 325 TABLET, FILM COATED ORAL at 02:45

## 2020-04-18 RX ADMIN — FERROUS SULFATE TAB 325 MG (65 MG ELEMENTAL FE) 325 MG: 325 (65 FE) TAB at 09:15

## 2020-04-18 RX ADMIN — ACETAMINOPHEN 975 MG: 325 TABLET, FILM COATED ORAL at 09:14

## 2020-04-18 RX ADMIN — IBUPROFEN 800 MG: 400 TABLET, FILM COATED ORAL at 06:39

## 2020-04-18 RX ADMIN — SENNOSIDES AND DOCUSATE SODIUM 1 TABLET: 8.6; 5 TABLET ORAL at 09:15

## 2020-04-18 RX ADMIN — IBUPROFEN 800 MG: 400 TABLET, FILM COATED ORAL at 12:37

## 2020-04-18 NOTE — PLAN OF CARE
Patient tolerating activity and caring for baby.  Fundus is firm and patient voiding without difficulty.  Taking Tylenol and Ibuprofen for discomfort.  Incision intact with steri strips and no drainage noted.  Planning on discharge today.

## 2020-04-18 NOTE — LACTATION NOTE
Routine and discharge visit with Eda, FOB, and baby boy. Eda is breastfeeding infant at time of visit. Infant latched well, nutritive suck pattern observed. Eda shares that infant has been cluster feeding. Her nipples are tender, using lanolin.  also provided hydrogels with instruction for use given..    Reviewed breastfeeding positions, latch, lip placement, pinching of nipple, colostrum, milk coming in, pumping, plugged milk ducts, mastitis, safe sleep, and safety of baby. Provided handout with [facility] video links.    Recommend unlimited, frequent breast feedings: At least 8 - 12 times every 24 hours. Avoid pacifiers and supplementation with formula unless medically indicated. Encouraged use of feeding log and to record feedings, and void/stool patterns. Reviewed breastfeeding section in [facility] patient education booklet. Eda has a pump for home use. Follow up with Pediatrician, encouraged lactation follow up. Reviewed outpatient lactation resources. Appreciative of visit.    Keysha Brannon RN, Lactation Educator

## 2020-04-18 NOTE — PROGRESS NOTES
[facility]   Obstetrics Progress Note    Subjective: This is the patient's second day since  delivery. She is doing well.  She is urinating on her own. Pain is controlled with medication.    Objective:   All vitals stable  Temp: 98.5  F (36.9  C) Temp src: Oral BP: 101/62 Pulse: 79 Heart Rate: 76 Resp: 16          EXAM:  Constitutional: healthy, alert, no distress.   Abdomen: Abdomen soft, non-tender. BS normal. No masses, fundus is firm.  JOINT/EXTREMITIES: extremities normal   Incision: clean, dry, intact with steris in place    Last hemoglobin was   Hemoglobin   Date Value Ref Range Status   2020 10.2 (L) 11.7 - 15.7 g/dL Final   ]    Assessment: Stable postpartum course.    Plan: Routine care. Ambulation encouraged  Breast feeding strategies discussed  Pain control measures as needed  Reportable signs and symptoms dicussed with the patient  Discharge later today    Alondra Puckett MD

## 2020-04-18 NOTE — PLAN OF CARE
Vital signs stable. Postpartum assessment WDL. Incision open to air, CDI. Pain controlled with tylenol and ibuprofen. Patient ambulating independently, voiding, passing gas, tolerating regular diet. Breastfeeding on cue with minimal assist. Patient and infant bonding well. Will continue with current plan of care, plan to discharge today.

## 2020-05-01 NOTE — DISCHARGE SUMMARY
Admit Date:     2020   Discharge Date:     2020      ADMISSION DIAGNOSES:   1.  Intrauterine pregnancy at 39 weeks' gestation.   2.  History of prior  section.   3.  History of infertility.      DISCHARGE DIAGNOSES:   1.  Intrauterine pregnancy at 39 weeks' gestation.   2.  History of prior  section, history of infertility.   3.  Status post repeat low transverse  section.      ATTENDING:  Los Rapp MD      SUMMARY OF HOSPITAL COURSE:  The patient is a 39-year-old, G1, P1-0-0-1, who presented to Labor and Delivery at 39 weeks' gestation for a scheduled repeat  section.  Of note, her prenatal care had been uncomplicated other than a history of 1 prior low transverse  section for malpresentation, as well as a history of infertility although this pregnancy was spontaneously conceived.  The patient had an unfavorable cervix in the office and due to COVID-19 with risk of uterine rupture in the setting of TOLAC and desire to avoid general anesthesia, decision was then made to proceed with a scheduled repeat  section.  The patient subsequently underwent a repeat low transverse  section without complications.  Please see dictated operative report for further details.        During the remainder of the patient's hospital course, she remained afebrile and her vital signs remained stable.  Her pain was initially well controlled with IV Toradol and she was successfully transitioned to oral pain medications only.  Her postop hemoglobin on day #1 was 10.2 and she was hemodynamically stable by vitals and exam.  Her Castro was removed on postop day #1 and she was able to void independently.  By postop day #2, she had return of bowel function as exhibited by passage of flatus.  She is ambulating without difficulty and she was deemed stable for discharge.      CONDITION ON DISCHARGE:  Stable.      DISCHARGE MEDICATIONS:  Please see MAR.      DISCHARGE  INSTRUCTIONS:  The patient was instructed to follow up in 6 weeks for routine postpartum and postoperative visit.  She was instructed to call or to return sooner if she should have fever greater than or equal to 100.4 degrees Fahrenheit, severe worsening abdominal pelvic pain, heavy persistent vaginal bleeding, chest pain, palpitations, shortness of breath, depressed mood or for any other major concern.         DEANNE SAGASTUME MD             D: 2020   T: 2020   MT: [place]      Name:     ELIER CHAVIRA   MRN:      [id number]        Account:        [id number]   :      1980           Admit Date:     2020                                  Discharge Date: 2020      Document: [id number]       cc: Shyla Joy PA-C

## 2021-01-09 ENCOUNTER — HEALTH MAINTENANCE LETTER (OUTPATIENT)
Age: 41
End: 2021-01-09

## 2021-10-23 ENCOUNTER — HEALTH MAINTENANCE LETTER (OUTPATIENT)
Age: 41
End: 2021-10-23

## 2022-02-12 ENCOUNTER — HEALTH MAINTENANCE LETTER (OUTPATIENT)
Age: 42
End: 2022-02-12

## 2022-07-10 SDOH — ECONOMIC STABILITY: FOOD INSECURITY: WITHIN THE PAST 12 MONTHS, YOU WORRIED THAT YOUR FOOD WOULD RUN OUT BEFORE YOU GOT MONEY TO BUY MORE.: NEVER TRUE

## 2022-07-10 SDOH — ECONOMIC STABILITY: INCOME INSECURITY: HOW HARD IS IT FOR YOU TO PAY FOR THE VERY BASICS LIKE FOOD, HOUSING, MEDICAL CARE, AND HEATING?: NOT HARD AT ALL

## 2022-07-10 SDOH — ECONOMIC STABILITY: FOOD INSECURITY: WITHIN THE PAST 12 MONTHS, THE FOOD YOU BOUGHT JUST DIDN'T LAST AND YOU DIDN'T HAVE MONEY TO GET MORE.: NEVER TRUE

## 2022-07-10 SDOH — HEALTH STABILITY: PHYSICAL HEALTH: ON AVERAGE, HOW MANY MINUTES DO YOU ENGAGE IN EXERCISE AT THIS LEVEL?: 20 MIN

## 2022-07-10 SDOH — ECONOMIC STABILITY: TRANSPORTATION INSECURITY
IN THE PAST 12 MONTHS, HAS LACK OF TRANSPORTATION KEPT YOU FROM MEETINGS, WORK, OR FROM GETTING THINGS NEEDED FOR DAILY LIVING?: NO

## 2022-07-10 SDOH — ECONOMIC STABILITY: INCOME INSECURITY: IN THE LAST 12 MONTHS, WAS THERE A TIME WHEN YOU WERE NOT ABLE TO PAY THE MORTGAGE OR RENT ON TIME?: NO

## 2022-07-10 SDOH — ECONOMIC STABILITY: TRANSPORTATION INSECURITY
IN THE PAST 12 MONTHS, HAS THE LACK OF TRANSPORTATION KEPT YOU FROM MEDICAL APPOINTMENTS OR FROM GETTING MEDICATIONS?: NO

## 2022-07-10 SDOH — HEALTH STABILITY: PHYSICAL HEALTH: ON AVERAGE, HOW MANY DAYS PER WEEK DO YOU ENGAGE IN MODERATE TO STRENUOUS EXERCISE (LIKE A BRISK WALK)?: 1 DAY

## 2022-07-10 ASSESSMENT — LIFESTYLE VARIABLES
HOW OFTEN DO YOU HAVE A DRINK CONTAINING ALCOHOL: MONTHLY OR LESS
HOW MANY STANDARD DRINKS CONTAINING ALCOHOL DO YOU HAVE ON A TYPICAL DAY: 1 OR 2
AUDIT-C TOTAL SCORE: 1
SKIP TO QUESTIONS 9-10: 1
HOW OFTEN DO YOU HAVE SIX OR MORE DRINKS ON ONE OCCASION: NEVER

## 2022-07-10 ASSESSMENT — ENCOUNTER SYMPTOMS
DYSURIA: 0
ARTHRALGIAS: 0
SHORTNESS OF BREATH: 0
DIARRHEA: 0
CONSTIPATION: 0
CHILLS: 0
PARESTHESIAS: 0
MYALGIAS: 0
WEAKNESS: 0
FEVER: 0
PALPITATIONS: 0
SORE THROAT: 0
NERVOUS/ANXIOUS: 0
FREQUENCY: 0
HEMATURIA: 0
HEADACHES: 0
HEARTBURN: 0
ABDOMINAL PAIN: 0
JOINT SWELLING: 0
BREAST MASS: 0
HEMATOCHEZIA: 0
COUGH: 0
EYE PAIN: 0
DIZZINESS: 0
NAUSEA: 0

## 2022-07-10 ASSESSMENT — SOCIAL DETERMINANTS OF HEALTH (SDOH)
DO YOU BELONG TO ANY CLUBS OR ORGANIZATIONS SUCH AS CHURCH GROUPS UNIONS, FRATERNAL OR ATHLETIC GROUPS, OR SCHOOL GROUPS?: NO
IN A TYPICAL WEEK, HOW MANY TIMES DO YOU TALK ON THE PHONE WITH FAMILY, FRIENDS, OR NEIGHBORS?: TWICE A WEEK
HOW OFTEN DO YOU GET TOGETHER WITH FRIENDS OR RELATIVES?: ONCE A WEEK
HOW OFTEN DO YOU ATTEND CHURCH OR RELIGIOUS SERVICES?: NEVER

## 2022-07-11 ENCOUNTER — OFFICE VISIT (OUTPATIENT)
Dept: FAMILY MEDICINE | Facility: CLINIC | Age: 42
End: 2022-07-11
Payer: COMMERCIAL

## 2022-07-11 VITALS
TEMPERATURE: 97.9 F | HEART RATE: 68 BPM | DIASTOLIC BLOOD PRESSURE: 78 MMHG | BODY MASS INDEX: 24.78 KG/M2 | HEIGHT: 68 IN | OXYGEN SATURATION: 99 % | SYSTOLIC BLOOD PRESSURE: 112 MMHG | RESPIRATION RATE: 16 BRPM | WEIGHT: 163.5 LBS

## 2022-07-11 DIAGNOSIS — Z00.00 ROUTINE HISTORY AND PHYSICAL EXAMINATION OF ADULT: Primary | ICD-10-CM

## 2022-07-11 DIAGNOSIS — Z12.4 PAP SMEAR FOR CERVICAL CANCER SCREENING: ICD-10-CM

## 2022-07-11 DIAGNOSIS — Z71.89 ADVANCED CARE PLANNING/COUNSELING DISCUSSION: ICD-10-CM

## 2022-07-11 PROBLEM — E78.2 MIXED HYPERLIPIDEMIA: Status: ACTIVE | Noted: 2022-07-11

## 2022-07-11 LAB
ALBUMIN SERPL-MCNC: 3.9 G/DL (ref 3.4–5)
ALP SERPL-CCNC: 49 U/L (ref 40–150)
ALT SERPL W P-5'-P-CCNC: 16 U/L (ref 0–50)
ANION GAP SERPL CALCULATED.3IONS-SCNC: 5 MMOL/L (ref 3–14)
AST SERPL W P-5'-P-CCNC: 10 U/L (ref 0–45)
BILIRUB SERPL-MCNC: 0.5 MG/DL (ref 0.2–1.3)
BUN SERPL-MCNC: 18 MG/DL (ref 7–30)
CALCIUM SERPL-MCNC: 9.2 MG/DL (ref 8.5–10.1)
CHLORIDE BLD-SCNC: 108 MMOL/L (ref 94–109)
CHOLEST SERPL-MCNC: 228 MG/DL
CO2 SERPL-SCNC: 26 MMOL/L (ref 20–32)
CREAT SERPL-MCNC: 0.75 MG/DL (ref 0.52–1.04)
ERYTHROCYTE [DISTWIDTH] IN BLOOD BY AUTOMATED COUNT: 13.5 % (ref 10–15)
FASTING STATUS PATIENT QL REPORTED: ABNORMAL
GFR SERPL CREATININE-BSD FRML MDRD: >90 ML/MIN/1.73M2
GLUCOSE BLD-MCNC: 92 MG/DL (ref 70–99)
HCT VFR BLD AUTO: 37.9 % (ref 35–47)
HDLC SERPL-MCNC: 65 MG/DL
HGB BLD-MCNC: 12.6 G/DL (ref 11.7–15.7)
LDLC SERPL CALC-MCNC: 144 MG/DL
MCH RBC QN AUTO: 30.6 PG (ref 26.5–33)
MCHC RBC AUTO-ENTMCNC: 33.2 G/DL (ref 31.5–36.5)
MCV RBC AUTO: 92 FL (ref 78–100)
NONHDLC SERPL-MCNC: 163 MG/DL
PLATELET # BLD AUTO: 282 10E3/UL (ref 150–450)
POTASSIUM BLD-SCNC: 3.7 MMOL/L (ref 3.4–5.3)
PROT SERPL-MCNC: 7.3 G/DL (ref 6.8–8.8)
RBC # BLD AUTO: 4.12 10E6/UL (ref 3.8–5.2)
SODIUM SERPL-SCNC: 139 MMOL/L (ref 133–144)
TRIGL SERPL-MCNC: 97 MG/DL
WBC # BLD AUTO: 7.8 10E3/UL (ref 4–11)

## 2022-07-11 PROCEDURE — G0124 SCREEN C/V THIN LAYER BY MD: HCPCS | Performed by: PATHOLOGY

## 2022-07-11 PROCEDURE — 85027 COMPLETE CBC AUTOMATED: CPT | Performed by: PHYSICIAN ASSISTANT

## 2022-07-11 PROCEDURE — 99386 PREV VISIT NEW AGE 40-64: CPT | Performed by: PHYSICIAN ASSISTANT

## 2022-07-11 PROCEDURE — 87624 HPV HI-RISK TYP POOLED RSLT: CPT | Performed by: PHYSICIAN ASSISTANT

## 2022-07-11 PROCEDURE — 80053 COMPREHEN METABOLIC PANEL: CPT | Performed by: PHYSICIAN ASSISTANT

## 2022-07-11 PROCEDURE — 36415 COLL VENOUS BLD VENIPUNCTURE: CPT | Performed by: PHYSICIAN ASSISTANT

## 2022-07-11 PROCEDURE — 80061 LIPID PANEL: CPT | Performed by: PHYSICIAN ASSISTANT

## 2022-07-11 PROCEDURE — G0145 SCR C/V CYTO,THINLAYER,RESCR: HCPCS | Performed by: PHYSICIAN ASSISTANT

## 2022-07-11 ASSESSMENT — ENCOUNTER SYMPTOMS
PALPITATIONS: 0
EYE PAIN: 0
HEMATURIA: 0
SHORTNESS OF BREATH: 0
ABDOMINAL PAIN: 0
NERVOUS/ANXIOUS: 0
BREAST MASS: 0
FEVER: 0
JOINT SWELLING: 0
NAUSEA: 0
FREQUENCY: 0
PARESTHESIAS: 0
CHILLS: 0
DIZZINESS: 0
WEAKNESS: 0
ARTHRALGIAS: 0
DIARRHEA: 0
CONSTIPATION: 0
MYALGIAS: 0
HEMATOCHEZIA: 0
SORE THROAT: 0
COUGH: 0
DYSURIA: 0
HEADACHES: 0
HEARTBURN: 0

## 2022-07-11 NOTE — PROGRESS NOTES
SUBJECTIVE:   CC: Eda Amaral is an 42 year old woman who presents for preventive health visit.     Patient has been advised of split billing requirements and indicates understanding: Yes     Healthy Habits:     Getting at least 3 servings of Calcium per day:  Yes    Bi-annual eye exam:  NO    Dental care twice a year:  Yes    Sleep apnea or symptoms of sleep apnea:  None    Diet:  Regular (no restrictions)    Frequency of exercise:  2-3 days/week    Duration of exercise:  30-45 minutes    Taking medications regularly:  Yes    Medication side effects:  None    PHQ-2 Total Score: 0    Additional concerns today:  No        Today's PHQ-2 Score:   PHQ-2 ( 1999 [employer]) 7/10/2022   Q1: Little interest or pleasure in doing things 0   Q2: Feeling down, depressed or hopeless 0   PHQ-2 Score 0   Q1: Little interest or pleasure in doing things Not at all   Q2: Feeling down, depressed or hopeless Not at all   PHQ-2 Score 0       Abuse: Current or Past (Physical, Sexual or Emotional) - No  Do you feel safe in your environment? Yes        Social History     Tobacco Use     Smoking status: Never Smoker     Smokeless tobacco: Never Used   Substance Use Topics     Alcohol use: No     Comment: Very rare     If you drink alcohol do you typically have >3 drinks per day or >7 drinks per week? No    Alcohol Use 7/11/2022   Prescreen: >3 drinks/day or >7 drinks/week? -   Prescreen: >3 drinks/day or >7 drinks/week? No       Reviewed orders with patient.  Reviewed health maintenance and updated orders accordingly - Yes  BP Readings from Last 3 Encounters:   07/11/22 112/78   04/18/20 101/62   11/28/16 116/64    Wt Readings from Last 3 Encounters:   07/11/22 74.2 kg (163 lb 8 oz)   04/16/20 88 kg (194 lb)   11/28/16 68.9 kg (152 lb)                Recent Labs   Lab Test 11/17/18  1302 11/28/16  0918 11/16/16  1525 11/05/16  0020   LDL  --  132*  --   --    HDL  --  58  --   --    TRIG  --  71  --   --    ALT  --   --  49 99*   CR  --    --  0.78 0.83   GFRESTIMATED  --   --  84 78   GFRESTBLACK  --   --  >90   GFR Calc   >90   GFR Calc     POTASSIUM  --   --  4.4 3.8   TSH 3.08  --   --   --         Breast Cancer Screening:    Breast CA Risk Assessment (FHS-7) 7/10/2022   Do you have a family history of breast, colon, or ovarian cancer? No / Unknown           Pertinent mammograms are reviewed under the imaging tab.    History of abnormal Pap smear: NO - age 30- 65 PAP every 3 years recommended  PAP / HPV Latest Ref Rng & Units 11/28/2016 12/6/2013   PAP (Historical) - NIL NIL   HPV16 NEG Negative -   HPV18 NEG Negative -   HRHPV NEG Negative -     Reviewed and updated as needed this visit by clinical staff   Tobacco  Allergies  Meds  Problems  Med Hx  Surg Hx  Fam Hx  Soc   Hx          Reviewed and updated as needed this visit by Provider   Tobacco  Allergies  Meds  Problems  Med Hx  Surg Hx  Fam Hx             Review of Systems   Constitutional: Negative for chills and fever.   HENT: Negative for congestion, ear pain, hearing loss and sore throat.    Eyes: Negative for pain and visual disturbance.   Respiratory: Negative for cough and shortness of breath.    Cardiovascular: Negative for chest pain, palpitations and peripheral edema.   Gastrointestinal: Negative for abdominal pain, constipation, diarrhea, heartburn, hematochezia and nausea.   Breasts:  Negative for tenderness, breast mass and discharge.   Genitourinary: Negative for dysuria, frequency, genital sores, hematuria, pelvic pain, urgency, vaginal bleeding and vaginal discharge.   Musculoskeletal: Negative for arthralgias, joint swelling and myalgias.   Skin: Negative for rash.   Neurological: Negative for dizziness, weakness, headaches and paresthesias.   Psychiatric/Behavioral: Negative for mood changes. The patient is not nervous/anxious.         OBJECTIVE:   /78 (BP Location: Right arm, Patient Position: Chair, Cuff Size: Adult  "Regular)   Pulse 68   Temp 97.9  F (36.6  C) (Oral)   Resp 16   Ht 1.727 m (5' 8\")   Wt 74.2 kg (163 lb 8 oz)   LMP 06/26/2022   SpO2 99%   BMI 24.86 kg/m    Physical Exam  GENERAL: healthy, alert and no distress  EYES: Eyes grossly normal to inspection, PERRL and conjunctivae and sclerae normal  HENT: ear canals and TM's normal, nose and mouth without ulcers or lesions  NECK: no adenopathy, no asymmetry, masses, or scars and thyroid normal to palpation  RESP: lungs clear to auscultation - no rales, rhonchi or wheezes  BREAST: normal without masses, tenderness or nipple discharge and no palpable axillary masses or adenopathy  CV: regular rate and rhythm, normal S1 S2, no S3 or S4, no murmur, click or rub, no peripheral edema and peripheral pulses strong  ABDOMEN: soft, nontender, no hepatosplenomegaly, no masses and bowel sounds normal   (female): normal female external genitalia, normal urethral meatus, vaginal mucosa pink, moist, well rugated, and normal cervix/adnexa/uterus without masses or discharge  MS: no gross musculoskeletal defects noted, no edema  SKIN: no suspicious lesions or rashes  NEURO: Normal strength and tone, mentation intact and speech normal  PSYCH: mentation appears normal, affect normal/bright    Diagnostic Test Results:  Labs reviewed in Epic    ASSESSMENT/PLAN:       ICD-10-CM    1. Routine history and physical examination of adult  Z00.00    2. Pap smear for cervical cancer screening  Z12.4    3. Advanced care planning/counseling discussion  Z71.89        Patient has been advised of split billing requirements and indicates understanding: Yes    COUNSELING:  Reviewed preventive health counseling, as reflected in patient instructions    Estimated body mass index is 24.86 kg/m  as calculated from the following:    Height as of this encounter: 1.727 m (5' 8\").    Weight as of this encounter: 74.2 kg (163 lb 8 oz).        She reports that she has never smoked. She has never used " smokeless tobacco.      Counseling Resources:  ATP IV Guidelines  Pooled Cohorts Equation Calculator  Breast Cancer Risk Calculator  BRCA-Related Cancer Risk Assessment: FHS-7 Tool  FRAX Risk Assessment  ICSI Preventive Guidelines  Dietary Guidelines for Americans, 2010  USDA's MyPlate  ASA Prophylaxis  Lung CA Screening    Shyla Joy PA-C  [facility]

## 2022-07-11 NOTE — RESULT ENCOUNTER NOTE
Alli Veras,    I just wanted to let you know that your lab results have been reviewed and are attached.    - Your cholesterol is high but the American Heart Association does not recommend starting a medication to lower this at this time.  Please try working on lowering the fat in your diet and increasing exercise.  Your risk for having a heart attack in the next 10 years is very low:  The 10-year ASCVD risk score (Frida ANGULO Jr., et al., 2013) is: 0.5%    Values used to calculate the score:      Age: 42 years      Sex: Female      Is Non- : No      Diabetic: No      Tobacco smoker: No      Systolic Blood Pressure: 112 mmHg      Is BP treated: No      HDL Cholesterol: 65 mg/dL      Total Cholesterol: 228 mg/dL   - Your metabolic panel shows:  normal electrolytes (sodium, potassium, calcium), kidney function (creatinine and GFR), non-fasting blood sugar (screening for diabetes) and liver function (AST/ALT).  - Your Blood Count Results show normal White Blood Cell count (no sign of infection), normal Hemoglobin (not anemia), and normal Platelets (affects clotting).    Please let me know if you have any questions and have a great week!    Sincerely,    Dunia Joy PA-C    [facility]  [address] [place] [address]  Clinic Phone: [phone]

## 2022-07-14 LAB
BKR LAB AP GYN ADEQUACY: ABNORMAL
BKR LAB AP GYN INTERPRETATION: ABNORMAL
BKR LAB AP HPV REFLEX: ABNORMAL
BKR LAB AP PREVIOUS ABNORMAL: ABNORMAL
PATH REPORT.COMMENTS IMP SPEC: ABNORMAL
PATH REPORT.COMMENTS IMP SPEC: ABNORMAL
PATH REPORT.RELEVANT HX SPEC: ABNORMAL

## 2022-07-15 PROBLEM — R87.610 ASCUS OF CERVIX WITH NEGATIVE HIGH RISK HPV: Status: ACTIVE | Noted: 2022-07-11

## 2022-07-15 LAB
HUMAN PAPILLOMA VIRUS 16 DNA: NEGATIVE
HUMAN PAPILLOMA VIRUS 18 DNA: NEGATIVE
HUMAN PAPILLOMA VIRUS FINAL DIAGNOSIS: NORMAL
HUMAN PAPILLOMA VIRUS OTHER HR: NEGATIVE

## 2022-10-09 ENCOUNTER — HEALTH MAINTENANCE LETTER (OUTPATIENT)
Age: 42
End: 2022-10-09

## 2023-08-19 ENCOUNTER — HEALTH MAINTENANCE LETTER (OUTPATIENT)
Age: 43
End: 2023-08-19

## 2024-03-16 ENCOUNTER — HEALTH MAINTENANCE LETTER (OUTPATIENT)
Age: 44
End: 2024-03-16

## 2024-10-12 ENCOUNTER — HEALTH MAINTENANCE LETTER (OUTPATIENT)
Age: 44
End: 2024-10-12

## 2025-07-25 ENCOUNTER — TELEPHONE (OUTPATIENT)
Dept: GASTROENTEROLOGY | Facility: CLINIC | Age: 45
End: 2025-07-25
Payer: COMMERCIAL

## 2025-07-25 NOTE — TELEPHONE ENCOUNTER
Pre visit planning completed.      Procedure details:    Patient scheduled for Colonoscopy on 8-12-25.     Arrival time: 0745. Procedure time 0830    Facility location: [facility]; [facility] [place] [address]. Check in location: 1st Floor [facility].     Sedation type: Conscious sedation     Pre op exam needed? No.    Indication for procedure: screening       Chart review:     Electronic implanted devices? No    Recent diagnosis of diverticulitis within the last 6 weeks? No      Medication review:    Diabetic? No    Anticoagulants? No    Weight loss medication/injectable? No GLP-1 medication per patient's medication list. Nursing to verify with pre-assessment call.    Other medication HOLDING recommendations:  N/A      Prep for procedure:     Bowel prep recommendation: Standard Miralax.   Due to: standard bowel prep    Procedure information and instructions sent via [employer]         Caty Sanford RN  Endoscopy Procedure Pre Assessment   [phone] option 3

## 2025-07-25 NOTE — TELEPHONE ENCOUNTER
Attempted to contact patient in order to complete pre assessment questions.     No answer. Left message to return call to [phone] option 3.    Callback communication sent via [employer].    Fadumo Morgan RN

## 2025-07-28 NOTE — TELEPHONE ENCOUNTER
Pre assessment completed for upcoming procedure.   (Please see previous telephone encounter notes for complete details)    Patient returned call.       Procedure details:    Procedure date 8/12/25, arrival time 0745 and facility location reviewed.    Pre op exam needed? No.    Designated  policy reviewed. Instructed to have someone stay 6  hours post procedure.       Medication review:    Medications reviewed. Please see supporting documentation below. Holding recommendations discussed (if applicable).   N/A      Prep for procedure:     Procedure prep instructions reviewed.        Any additional information needed:  N/A      Patient verbalized understanding and had no questions or concerns at this time.      Jasmin Hawk RN  Endoscopy Procedure Pre Assessment   [phone] option 3

## 2025-08-04 ENCOUNTER — ANCILLARY PROCEDURE (OUTPATIENT)
Dept: MAMMOGRAPHY | Facility: CLINIC | Age: 45
End: 2025-08-04
Attending: NURSE PRACTITIONER
Payer: COMMERCIAL

## 2025-08-04 DIAGNOSIS — Z12.31 VISIT FOR SCREENING MAMMOGRAM: ICD-10-CM

## 2025-08-04 PROCEDURE — 77063 BREAST TOMOSYNTHESIS BI: CPT | Mod: TC | Performed by: RADIOLOGY

## 2025-08-04 PROCEDURE — 77067 SCR MAMMO BI INCL CAD: CPT | Mod: TC | Performed by: RADIOLOGY

## 2025-08-12 ENCOUNTER — HOSPITAL ENCOUNTER (OUTPATIENT)
Facility: CLINIC | Age: 45
Discharge: HOME | End: 2025-08-12
Attending: COLON & RECTAL SURGERY | Admitting: COLON & RECTAL SURGERY
Payer: COMMERCIAL

## 2025-08-12 VITALS
OXYGEN SATURATION: 99 % | WEIGHT: 160 LBS | HEIGHT: 68 IN | SYSTOLIC BLOOD PRESSURE: 102 MMHG | HEART RATE: 61 BPM | BODY MASS INDEX: 24.25 KG/M2 | DIASTOLIC BLOOD PRESSURE: 65 MMHG | RESPIRATION RATE: 18 BRPM

## 2025-08-12 LAB — COLONOSCOPY: NORMAL

## 2025-08-12 PROCEDURE — 250N000011 HC RX IP 250 OP 636: Performed by: COLON & RECTAL SURGERY

## 2025-08-12 PROCEDURE — G0121 COLON CA SCRN NOT HI RSK IND: HCPCS | Performed by: COLON & RECTAL SURGERY

## 2025-08-12 PROCEDURE — G0500 MOD SEDAT ENDO SERVICE >5YRS: HCPCS | Performed by: COLON & RECTAL SURGERY

## 2025-08-12 RX ORDER — ONDANSETRON 2 MG/ML
4 INJECTION INTRAMUSCULAR; INTRAVENOUS
Status: DISCONTINUED | OUTPATIENT
Start: 2025-08-12 | End: 2025-08-12 | Stop reason: HOSPADM

## 2025-08-12 RX ORDER — FLUMAZENIL 0.1 MG/ML
0.2 INJECTION, SOLUTION INTRAVENOUS
Status: CANCELLED | OUTPATIENT
Start: 2025-08-12 | End: 2025-08-12

## 2025-08-12 RX ORDER — SODIUM CHLORIDE, SODIUM LACTATE, POTASSIUM CHLORIDE, CALCIUM CHLORIDE 600; 310; 30; 20 MG/100ML; MG/100ML; MG/100ML; MG/100ML
INJECTION, SOLUTION INTRAVENOUS CONTINUOUS
Status: DISCONTINUED | OUTPATIENT
Start: 2025-08-12 | End: 2025-08-12 | Stop reason: HOSPADM

## 2025-08-12 RX ORDER — ONDANSETRON 2 MG/ML
4 INJECTION INTRAMUSCULAR; INTRAVENOUS EVERY 6 HOURS PRN
Status: CANCELLED | OUTPATIENT
Start: 2025-08-12

## 2025-08-12 RX ORDER — NALOXONE HYDROCHLORIDE 0.4 MG/ML
0.4 INJECTION, SOLUTION INTRAMUSCULAR; INTRAVENOUS; SUBCUTANEOUS
Status: CANCELLED | OUTPATIENT
Start: 2025-08-12

## 2025-08-12 RX ORDER — ONDANSETRON 2 MG/ML
INJECTION INTRAMUSCULAR; INTRAVENOUS PRN
Status: DISCONTINUED | OUTPATIENT
Start: 2025-08-12 | End: 2025-08-12 | Stop reason: HOSPADM

## 2025-08-12 RX ORDER — FENTANYL CITRATE 50 UG/ML
INJECTION, SOLUTION INTRAMUSCULAR; INTRAVENOUS PRN
Status: DISCONTINUED | OUTPATIENT
Start: 2025-08-12 | End: 2025-08-12 | Stop reason: HOSPADM

## 2025-08-12 RX ORDER — ONDANSETRON 4 MG/1
4 TABLET, ORALLY DISINTEGRATING ORAL EVERY 6 HOURS PRN
Status: CANCELLED | OUTPATIENT
Start: 2025-08-12

## 2025-08-12 RX ORDER — NALOXONE HYDROCHLORIDE 0.4 MG/ML
0.2 INJECTION, SOLUTION INTRAMUSCULAR; INTRAVENOUS; SUBCUTANEOUS
Status: CANCELLED | OUTPATIENT
Start: 2025-08-12

## 2025-08-12 RX ORDER — PROCHLORPERAZINE MALEATE 10 MG
10 TABLET ORAL EVERY 6 HOURS PRN
Status: CANCELLED | OUTPATIENT
Start: 2025-08-12

## 2025-08-12 RX ORDER — LIDOCAINE 40 MG/G
CREAM TOPICAL
Status: DISCONTINUED | OUTPATIENT
Start: 2025-08-12 | End: 2025-08-12 | Stop reason: HOSPADM

## 2025-08-12 ASSESSMENT — ACTIVITIES OF DAILY LIVING (ADL)
ADLS_ACUITY_SCORE: 42
ADLS_ACUITY_SCORE: 42

## (undated) DEVICE — SUCTION CANISTER MEDIVAC LINER 3000ML W/LID 65651-530

## (undated) DEVICE — ESU GROUND PAD ADULT W/CORD E7507

## (undated) DEVICE — GLOVE PROTEXIS MICRO 6.5  2D73PM65

## (undated) DEVICE — LINEN BABY BLANKET 5434

## (undated) DEVICE — STOCKING SLEEVE VASOPRESS COMPRESSION CALF MED VP501M

## (undated) DEVICE — BAG CLEAR TRASH 1.3M 39X33" P4040C

## (undated) DEVICE — TRANSFER DEVICE BLOOD NDL HOLDER 364880

## (undated) DEVICE — SOL NACL 0.9% IRRIG 1000ML BOTTLE 2F7124

## (undated) DEVICE — DRSG TEGADERM 4X10" 1627

## (undated) DEVICE — SUCTION CANISTER STRAW 65652-008

## (undated) DEVICE — LINEN HALF SHEET 5512

## (undated) DEVICE — DRAPE IOBAN C-SECTION W/POUCH 30X35" 6657

## (undated) DEVICE — BLADE CLIPPER SGL USE 9680

## (undated) DEVICE — LINEN FULL SHEET 5511

## (undated) DEVICE — PACK C-SECTION LF PL15OTA83B

## (undated) DEVICE — CATH TRAY FOLEY 16FR DRAINAGE BAG STATLOCK 899916

## (undated) DEVICE — LINEN TOWEL PACK X5 5464

## (undated) DEVICE — PREP CHLORAPREP 26ML TINTED ORANGE  260815

## (undated) DEVICE — PAD CHUX UNDERPAD 30X30"

## (undated) DEVICE — GLOVE PROTEXIS BLUE W/NEU-THERA 7.0  2D73EB70

## (undated) DEVICE — SU VICRYL 0 CT-1 36" J346H

## (undated) DEVICE — DRSG STERI STRIP 1/2X4" R1547

## (undated) DEVICE — SOL WATER IRRIG 1000ML BOTTLE 2F7114

## (undated) DEVICE — SOL ADH LIQUID BENZOIN SWAB 0.6ML C1544

## (undated) DEVICE — CAP BABY PINK/BLUE IC-2

## (undated) RX ORDER — ONDANSETRON 2 MG/ML
INJECTION INTRAMUSCULAR; INTRAVENOUS
Status: DISPENSED
Start: 2025-08-12

## (undated) RX ORDER — FENTANYL CITRATE 50 UG/ML
INJECTION, SOLUTION INTRAMUSCULAR; INTRAVENOUS
Status: DISPENSED
Start: 2025-08-12